# Patient Record
Sex: MALE | Race: WHITE | NOT HISPANIC OR LATINO | URBAN - METROPOLITAN AREA
[De-identification: names, ages, dates, MRNs, and addresses within clinical notes are randomized per-mention and may not be internally consistent; named-entity substitution may affect disease eponyms.]

---

## 2024-02-15 ENCOUNTER — OFFICE (OUTPATIENT)
Dept: URBAN - METROPOLITAN AREA CLINIC 16 | Facility: CLINIC | Age: 44
End: 2024-02-15

## 2024-02-15 VITALS
DIASTOLIC BLOOD PRESSURE: 84 MMHG | WEIGHT: 205 LBS | OXYGEN SATURATION: 98 % | HEART RATE: 118 BPM | HEIGHT: 72 IN | SYSTOLIC BLOOD PRESSURE: 140 MMHG

## 2024-02-15 DIAGNOSIS — F10.11 ALCOHOL ABUSE, IN REMISSION: ICD-10-CM

## 2024-02-15 DIAGNOSIS — M10.9 GOUT, UNSPECIFIED: ICD-10-CM

## 2024-02-15 DIAGNOSIS — K70.30 ALCOHOLIC CIRRHOSIS OF LIVER WITHOUT ASCITES: ICD-10-CM

## 2024-02-15 DIAGNOSIS — D69.6 THROMBOCYTOPENIA, UNSPECIFIED: ICD-10-CM

## 2024-02-15 PROCEDURE — 99204 OFFICE O/P NEW MOD 45 MIN: CPT | Performed by: INTERNAL MEDICINE

## 2024-02-15 NOTE — SERVICENOTES
I would like him to follow-up with my MICAH for a well visit in 3 months and follow improvement for liver tests off alcohol.  We discussed today eventual evaluation with a hepatologist given likely underlying alcoholic cirrhosis and eventual need for possible transplant need.  Regardless pending ultrasound findings we will have a protocol accordingly and he may see me for an EGD in the future as well

## 2024-02-15 NOTE — SERVICEHPINOTES
Gelacio Gonzales   is a   43   year old   male   patient who is seen   at the request of   Dylan See   for a consultation/initial visit.  Jorge has a longstanding history of alcohol use. He had drank several beers per day. Most occasional hard liquor. He has issues of abnormal findings for the last decade and concerns for alcoholic liver disease and was recently seen in the ER with alcohol withdrawal symptoms and marked abnormal LFTs transaminitis and even thrombocytopeniaI reviewed his reports from the ER and he was noted to have sinus congestion and alcohol use
br. He has not had a drink since January 5 and I reviewed his records from the sixth in the ER. He did have markedly abnormal labs with an AST of 134 total bilirubin of 7.7 and direct 3.97 alk phos of 144. His platelet count was noted to be low at 49 his lipase was normal. He does have anxiety and possibly some depression that has led to some degree of alcohol use and alcoholism. He was not having alcoholic withdrawal seizures at that time he does have good support with his fiancée and mom regardless during his hospital workup he had non-COVID related sinus congestion issues otherwise but no shortness of breath.He has no history of ascites otherwise

## 2024-07-15 ENCOUNTER — OFFICE (OUTPATIENT)
Dept: URBAN - METROPOLITAN AREA CLINIC 16 | Facility: CLINIC | Age: 44
End: 2024-07-15

## 2024-07-15 VITALS
HEIGHT: 72 IN | WEIGHT: 253 LBS | HEART RATE: 96 BPM | SYSTOLIC BLOOD PRESSURE: 142 MMHG | DIASTOLIC BLOOD PRESSURE: 74 MMHG

## 2024-07-15 DIAGNOSIS — F10.11 ALCOHOL ABUSE, IN REMISSION: ICD-10-CM

## 2024-07-15 DIAGNOSIS — K70.30 ALCOHOLIC CIRRHOSIS OF LIVER WITHOUT ASCITES: ICD-10-CM

## 2024-07-15 DIAGNOSIS — R60.9 EDEMA, UNSPECIFIED: ICD-10-CM

## 2024-07-15 PROCEDURE — 99215 OFFICE O/P EST HI 40 MIN: CPT | Performed by: INTERNAL MEDICINE

## 2024-07-15 NOTE — SERVICENOTES
He had 1 time relapse for a week of alcohol and have been absent since January for the most part.  He has advanced liver disease and high bilirubin and will need to be monitored for fulminant hepatic failure over time he has some degree of decompensated liver disease already but did not have ascites he will need to be monitored for HCC with ultrasound and alpha-fetoprotein and since it will take several months to see liver specialist I will put the referral in at this time.  He will continue current measures and use milk thistle and monitor for further changes..  I am going to start him on rifaximin as there has been some concern for encephalopathy.  He needs to have follow-up with his medical team and I recommend with some lower extremity edema Lasix to be of benefit 40 mg and can start and manage with his medical team.  It is imperative that he is off alcohol and will need to see the transplant team as he has decompensated liver disease with advanced end-stage liver disease at this time decompensation with edema starting and some confusion and he is to start on rifaximin as well

## 2024-07-15 NOTE — SERVICEHPINOTES
Gelacio Gonzales   is seen today for a follow-up visit.     He does have a history of alcoholic liver disease and had marked abnormal transaminases and findings of thrombocytopenia and even had alcohol withdrawal symptoms in the past. When I had seen him in February he had stopped drinking in January and he had AST of 134 with a total bilirubin of 7.7 and a direct of 3.97 and alk phos of 144 his platelet count was significantly low at 49 that would be consistent with advanced EtOH liver disease. He has no history of ascites with his alcoholic liver disease. It is imperative at this time that he be completely abstinent from alcohol and he does have advanced liver disease by finding regardless and his risk for hepatoma and will need alpha-fetoprotein and ultrasound every 6 months. If needed alcohol rehab should be undertaken. He did have the relapse that was close to Memorial Day with week he has stress anxiety and other issues that may have led to recurrent alcohol